# Patient Record
Sex: MALE | Race: WHITE | URBAN - METROPOLITAN AREA
[De-identification: names, ages, dates, MRNs, and addresses within clinical notes are randomized per-mention and may not be internally consistent; named-entity substitution may affect disease eponyms.]

---

## 2020-10-16 ENCOUNTER — TELEPHONE (OUTPATIENT)
Dept: ORTHOPEDIC SURGERY | Age: 60
End: 2020-10-16

## 2020-10-16 RX ORDER — TRAMADOL HYDROCHLORIDE 50 MG/1
50 TABLET ORAL DAILY
Qty: 21 TABLET | Refills: 0 | Status: SHIPPED | OUTPATIENT
Start: 2020-10-16 | End: 2020-11-06

## 2020-10-16 RX ORDER — DIAZEPAM 5 MG/1
5 TABLET ORAL NIGHTLY PRN
Qty: 21 TABLET | Refills: 0 | Status: SHIPPED | OUTPATIENT
Start: 2020-10-16 | End: 2020-11-06

## 2020-10-16 NOTE — TELEPHONE ENCOUNTER
PT is requesting a refill on medications:     PT is s/p RT TKA 8/26/20     Currently taking:   Tramadol 1x day   Valium 1x day     [facility]

## 2020-11-05 ENCOUNTER — TELEPHONE (OUTPATIENT)
Dept: ORTHOPEDIC SURGERY | Age: 60
End: 2020-11-05

## 2020-11-24 ENCOUNTER — OFFICE VISIT (OUTPATIENT)
Dept: ORTHOPEDIC SURGERY | Age: 60
End: 2020-11-24

## 2020-11-24 VITALS — BODY MASS INDEX: 27.06 KG/M2 | WEIGHT: 189 LBS | HEIGHT: 70 IN

## 2020-11-24 PROCEDURE — 99024 POSTOP FOLLOW-UP VISIT: CPT | Performed by: ORTHOPAEDIC SURGERY

## 2020-11-24 RX ORDER — MELOXICAM 15 MG/1
15 TABLET ORAL DAILY
Qty: 30 TABLET | Refills: 3 | Status: SHIPPED | OUTPATIENT
Start: 2020-11-24

## 2020-11-24 RX ORDER — TRAMADOL HYDROCHLORIDE 50 MG/1
50 TABLET ORAL EVERY 6 HOURS PRN
Qty: 28 TABLET | Refills: 0 | Status: SHIPPED | OUTPATIENT
Start: 2020-11-24 | End: 2020-12-01

## 2020-11-24 NOTE — PROGRESS NOTES
Date of Encounter: 11/24/2020  Patient Name:Vernon Roberson    Chief Complaint   Patient presents with    Post-Op Check     RT TKA        History of Present Illness:  Patient is 3 months out from his right total knee. Overall doing well and quite pleased. Still would like to work on his strength component of his rehab and will do this on his own. He does get some stiffness and some swelling at the end of the day and certainly more problematic on days he is more active. History reviewed. No pertinent past medical history. History reviewed. No pertinent surgical history. Current Outpatient Medications   Medication Sig Dispense Refill    meloxicam (MOBIC) 15 MG tablet Take 1 tablet by mouth daily 30 tablet 3    traMADol (ULTRAM) 50 MG tablet Take 1 tablet by mouth every 6 hours as needed for Pain for up to 7 days. Intended supply: 7 days. Take lowest dose possible to manage pain 28 tablet 0     No current facility-administered medications for this visit. allergies, social and family histories were reviewed and updated as appropriate. Review of Systems:  Relevant review of systems reviewed and available in the patient's chart and scanned in under the MEDIA tab on 11/24/2020. Vital Signs:  Ht 5' 10\" (1.778 m)   Wt 189 lb (85.7 kg)   BMI 27.12 kg/m²     General Exam:   Constitutional: He is adequately groomed with no evidence of malnutrition, obesity absent  Mental Status: He is oriented to time, place and person. Normal mentation and affect for age. Lymphatic: The lymphatic examination bilaterally reveals all areas to be without enlargement or induration. Vascular: Examination reveals no swelling or calf tenderness. Peripheral pulses are palpable and 2+. Neurological: He has good coordination and balance. There is no focal weakness or sensory deficit. Pertinent Exam:  Right knee has full extension and 125 degrees of flexion. Does have a mild effusion and a mild synovitis.   Is got good stability and good quad strength. Xray Findings:  No x-rays were obtained    Assessment & Plan:  Patient overall is quite pleased with his total knee. Going to place him on meloxicam for at least 30 days with a few refills to take as needed after that. Hopefully this will get with some of the residual stiffness and swelling he has. Continue working on his strength. May advance activities at his discretion. I will see him back at his 1 year anniversary with x-rays      Documentation was done using voice recognition dragon software. Every effort was made to ensure accuracy; however, inadvertent  Unintentional computerized transcription errors may be present.